# Patient Record
Sex: FEMALE | Race: WHITE
[De-identification: names, ages, dates, MRNs, and addresses within clinical notes are randomized per-mention and may not be internally consistent; named-entity substitution may affect disease eponyms.]

---

## 2017-01-11 ENCOUNTER — HOSPITAL ENCOUNTER (OUTPATIENT)
Dept: HOSPITAL 62 - OD | Age: 32
End: 2017-01-11
Attending: PSYCHIATRY & NEUROLOGY
Payer: MEDICARE

## 2017-01-11 DIAGNOSIS — F31.4: Primary | ICD-10-CM

## 2017-01-11 LAB
ALBUMIN SERPL-MCNC: 3.9 G/DL (ref 3.5–5)
ALP SERPL-CCNC: 82 U/L (ref 38–126)
ALT SERPL-CCNC: 28 U/L (ref 9–52)
ANION GAP SERPL CALC-SCNC: 10 MMOL/L (ref 5–19)
AST SERPL-CCNC: 20 U/L (ref 14–36)
BASOPHILS # BLD AUTO: 0 10^3/UL (ref 0–0.2)
BASOPHILS NFR BLD AUTO: 0.5 % (ref 0–2)
BILIRUB DIRECT SERPL-MCNC: 0 MG/DL (ref 0–0.3)
BILIRUB SERPL-MCNC: 0.4 MG/DL (ref 0.2–1.3)
BUN SERPL-MCNC: 7 MG/DL (ref 7–20)
CALCIUM: 9 MG/DL (ref 8.4–10.2)
CHLORIDE SERPL-SCNC: 108 MMOL/L (ref 98–107)
CO2 SERPL-SCNC: 24 MMOL/L (ref 22–30)
CREAT SERPL-MCNC: 0.64 MG/DL (ref 0.52–1.25)
EOSINOPHIL # BLD AUTO: 0.4 10^3/UL (ref 0–0.6)
EOSINOPHIL NFR BLD AUTO: 3.9 % (ref 0–6)
ERYTHROCYTE [DISTWIDTH] IN BLOOD BY AUTOMATED COUNT: 14.4 % (ref 11.5–14)
GLUCOSE SERPL-MCNC: 102 MG/DL (ref 75–110)
HCT VFR BLD CALC: 35.7 % (ref 36–47)
HGB BLD-MCNC: 12 G/DL (ref 12–15.5)
HGB HCT DIFFERENCE: 0.3
LITHIUM SERPL-SCNC: 0.6 MEQ/L (ref 0.6–1.2)
LYMPHOCYTES # BLD AUTO: 2.8 10^3/UL (ref 0.5–4.7)
LYMPHOCYTES NFR BLD AUTO: 30.2 % (ref 13–45)
MCH RBC QN AUTO: 28.3 PG (ref 27–33.4)
MCHC RBC AUTO-ENTMCNC: 33.6 G/DL (ref 32–36)
MCV RBC AUTO: 84 FL (ref 80–97)
MONOCYTES # BLD AUTO: 0.5 10^3/UL (ref 0.1–1.4)
MONOCYTES NFR BLD AUTO: 5.3 % (ref 3–13)
NEUTROPHILS # BLD AUTO: 5.6 10^3/UL (ref 1.7–8.2)
NEUTS SEG NFR BLD AUTO: 60.1 % (ref 42–78)
POTASSIUM SERPL-SCNC: 4.5 MMOL/L (ref 3.6–5)
PROT SERPL-MCNC: 6.2 G/DL (ref 6.3–8.2)
RBC # BLD AUTO: 4.24 10^6/UL (ref 3.72–5.28)
SODIUM SERPL-SCNC: 142.2 MMOL/L (ref 137–145)
WBC # BLD AUTO: 9.3 10^3/UL (ref 4–10.5)

## 2017-01-11 PROCEDURE — 80178 ASSAY OF LITHIUM: CPT

## 2017-01-11 PROCEDURE — 36415 COLL VENOUS BLD VENIPUNCTURE: CPT

## 2017-01-11 PROCEDURE — 85025 COMPLETE CBC W/AUTO DIFF WBC: CPT

## 2017-01-11 PROCEDURE — 80053 COMPREHEN METABOLIC PANEL: CPT

## 2017-02-16 ENCOUNTER — HOSPITAL ENCOUNTER (OUTPATIENT)
Dept: HOSPITAL 62 - OD | Age: 32
End: 2017-02-16
Attending: PSYCHIATRY & NEUROLOGY
Payer: MEDICARE

## 2017-02-16 DIAGNOSIS — F31.4: Primary | ICD-10-CM

## 2017-02-16 LAB
ALBUMIN SERPL-MCNC: 4.2 G/DL (ref 3.5–5)
ALP SERPL-CCNC: 108 U/L (ref 38–126)
ALT SERPL-CCNC: 40 U/L (ref 9–52)
ANION GAP SERPL CALC-SCNC: 11 MMOL/L (ref 5–19)
AST SERPL-CCNC: 36 U/L (ref 14–36)
BASOPHILS # BLD AUTO: 0.1 10^3/UL (ref 0–0.2)
BASOPHILS NFR BLD AUTO: 0.5 % (ref 0–2)
BILIRUB DIRECT SERPL-MCNC: 0 MG/DL (ref 0–0.3)
BILIRUB SERPL-MCNC: 0.5 MG/DL (ref 0.2–1.3)
BUN SERPL-MCNC: 5 MG/DL (ref 7–20)
CALCIUM: 9.6 MG/DL (ref 8.4–10.2)
CHLORIDE SERPL-SCNC: 106 MMOL/L (ref 98–107)
CO2 SERPL-SCNC: 25 MMOL/L (ref 22–30)
CREAT SERPL-MCNC: 0.75 MG/DL (ref 0.52–1.25)
EOSINOPHIL # BLD AUTO: 0.2 10^3/UL (ref 0–0.6)
EOSINOPHIL NFR BLD AUTO: 2.3 % (ref 0–6)
ERYTHROCYTE [DISTWIDTH] IN BLOOD BY AUTOMATED COUNT: 14.9 % (ref 11.5–14)
GLUCOSE SERPL-MCNC: 114 MG/DL (ref 75–110)
HCT VFR BLD CALC: 38.4 % (ref 36–47)
HGB BLD-MCNC: 12.1 G/DL (ref 12–15.5)
HGB HCT DIFFERENCE: -2.1
LITHIUM SERPL-SCNC: 0.8 MEQ/L (ref 0.6–1.2)
LYMPHOCYTES # BLD AUTO: 2.5 10^3/UL (ref 0.5–4.7)
LYMPHOCYTES NFR BLD AUTO: 23.9 % (ref 13–45)
MCH RBC QN AUTO: 27 PG (ref 27–33.4)
MCHC RBC AUTO-ENTMCNC: 31.5 G/DL (ref 32–36)
MCV RBC AUTO: 86 FL (ref 80–97)
MONOCYTES # BLD AUTO: 0.5 10^3/UL (ref 0.1–1.4)
MONOCYTES NFR BLD AUTO: 4.9 % (ref 3–13)
NEUTROPHILS # BLD AUTO: 7.1 10^3/UL (ref 1.7–8.2)
NEUTS SEG NFR BLD AUTO: 68.4 % (ref 42–78)
POTASSIUM SERPL-SCNC: 4.6 MMOL/L (ref 3.6–5)
PROT SERPL-MCNC: 6.9 G/DL (ref 6.3–8.2)
RBC # BLD AUTO: 4.47 10^6/UL (ref 3.72–5.28)
SODIUM SERPL-SCNC: 141.8 MMOL/L (ref 137–145)
TSH SERPL-ACNC: 0.99 UIU/ML (ref 0.47–4.68)
WBC # BLD AUTO: 10.3 10^3/UL (ref 4–10.5)

## 2017-02-16 PROCEDURE — 80053 COMPREHEN METABOLIC PANEL: CPT

## 2017-02-16 PROCEDURE — 84443 ASSAY THYROID STIM HORMONE: CPT

## 2017-02-16 PROCEDURE — 80178 ASSAY OF LITHIUM: CPT

## 2017-02-16 PROCEDURE — 85025 COMPLETE CBC W/AUTO DIFF WBC: CPT

## 2017-02-16 PROCEDURE — 84439 ASSAY OF FREE THYROXINE: CPT

## 2017-02-16 PROCEDURE — 36415 COLL VENOUS BLD VENIPUNCTURE: CPT

## 2017-04-18 ENCOUNTER — HOSPITAL ENCOUNTER (OUTPATIENT)
Dept: HOSPITAL 62 - OD | Age: 32
End: 2017-04-18
Attending: PSYCHIATRY & NEUROLOGY
Payer: MEDICARE

## 2017-04-18 DIAGNOSIS — F43.12: Primary | ICD-10-CM

## 2017-04-18 DIAGNOSIS — Z79.899: ICD-10-CM

## 2017-04-18 LAB
ALBUMIN SERPL-MCNC: 4.5 G/DL (ref 3.5–5)
ALP SERPL-CCNC: 103 U/L (ref 38–126)
ALT SERPL-CCNC: 46 U/L (ref 9–52)
ANION GAP SERPL CALC-SCNC: 11 MMOL/L (ref 5–19)
AST SERPL-CCNC: 40 U/L (ref 14–36)
BASOPHILS # BLD AUTO: 0.1 10^3/UL (ref 0–0.2)
BASOPHILS NFR BLD AUTO: 0.6 % (ref 0–2)
BILIRUB DIRECT SERPL-MCNC: 0.2 MG/DL (ref 0–0.4)
BILIRUB SERPL-MCNC: 0.3 MG/DL (ref 0.2–1.3)
BUN SERPL-MCNC: 8 MG/DL (ref 7–20)
CALCIUM: 9.4 MG/DL (ref 8.4–10.2)
CHLORIDE SERPL-SCNC: 106 MMOL/L (ref 98–107)
CO2 SERPL-SCNC: 24 MMOL/L (ref 22–30)
CREAT SERPL-MCNC: 0.62 MG/DL (ref 0.52–1.25)
EOSINOPHIL # BLD AUTO: 0.3 10^3/UL (ref 0–0.6)
EOSINOPHIL NFR BLD AUTO: 3.9 % (ref 0–6)
ERYTHROCYTE [DISTWIDTH] IN BLOOD BY AUTOMATED COUNT: 15.9 % (ref 11.5–14)
GLUCOSE SERPL-MCNC: 100 MG/DL (ref 75–110)
HCT VFR BLD CALC: 37.6 % (ref 36–47)
HGB BLD-MCNC: 12.3 G/DL (ref 12–15.5)
HGB HCT DIFFERENCE: -0.7
LITHIUM SERPL-SCNC: 1.2 MEQ/L (ref 0.6–1.2)
LYMPHOCYTES # BLD AUTO: 2 10^3/UL (ref 0.5–4.7)
LYMPHOCYTES NFR BLD AUTO: 22.9 % (ref 13–45)
MCH RBC QN AUTO: 27.1 PG (ref 27–33.4)
MCHC RBC AUTO-ENTMCNC: 32.7 G/DL (ref 32–36)
MCV RBC AUTO: 83 FL (ref 80–97)
MONOCYTES # BLD AUTO: 0.5 10^3/UL (ref 0.1–1.4)
MONOCYTES NFR BLD AUTO: 5.3 % (ref 3–13)
NEUTROPHILS # BLD AUTO: 5.9 10^3/UL (ref 1.7–8.2)
NEUTS SEG NFR BLD AUTO: 67.3 % (ref 42–78)
POTASSIUM SERPL-SCNC: 5.1 MMOL/L (ref 3.6–5)
PROT SERPL-MCNC: 7.2 G/DL (ref 6.3–8.2)
RBC # BLD AUTO: 4.53 10^6/UL (ref 3.72–5.28)
SODIUM SERPL-SCNC: 141.1 MMOL/L (ref 137–145)
WBC # BLD AUTO: 8.8 10^3/UL (ref 4–10.5)

## 2017-04-18 PROCEDURE — 80178 ASSAY OF LITHIUM: CPT

## 2017-04-18 PROCEDURE — 85025 COMPLETE CBC W/AUTO DIFF WBC: CPT

## 2017-04-18 PROCEDURE — 84443 ASSAY THYROID STIM HORMONE: CPT

## 2017-04-18 PROCEDURE — 80053 COMPREHEN METABOLIC PANEL: CPT

## 2017-04-18 PROCEDURE — 36415 COLL VENOUS BLD VENIPUNCTURE: CPT

## 2017-04-18 PROCEDURE — 80156 ASSAY CARBAMAZEPINE TOTAL: CPT

## 2018-01-27 ENCOUNTER — HOSPITAL ENCOUNTER (EMERGENCY)
Dept: HOSPITAL 62 - ER | Age: 33
Discharge: HOME | End: 2018-01-27
Payer: MEDICARE

## 2018-01-27 DIAGNOSIS — Z98.84: ICD-10-CM

## 2018-01-27 DIAGNOSIS — F17.200: ICD-10-CM

## 2018-01-27 DIAGNOSIS — I10: ICD-10-CM

## 2018-01-27 DIAGNOSIS — Z90.89: ICD-10-CM

## 2018-01-27 DIAGNOSIS — R05: ICD-10-CM

## 2018-01-27 DIAGNOSIS — J06.9: Primary | ICD-10-CM

## 2018-01-27 DIAGNOSIS — Z87.01: ICD-10-CM

## 2018-01-27 DIAGNOSIS — R09.89: ICD-10-CM

## 2018-01-27 PROCEDURE — 99283 EMERGENCY DEPT VISIT LOW MDM: CPT

## 2018-01-27 NOTE — ER DOCUMENT REPORT
HPI





- HPI


Pain Level: 4


Notes: 





Patient is a 32-year-old female who presents to the ED complaining of nasal 

congestion/discharge, dry nonproductive cough, irritated throat, subjective 

fever, body aches 3 days.  Patient states that she has had symptoms for 3 days 

not 5 that was reported at [facility].  Patient states that she is still eating and 

drinking without any difficulties.  She is urinating normally and having normal 

bowel movements.  Patient has been using some over-the-counter cold medications 

with minimal relief.  Patient does admit to smoking but denies IV drug use.  

Patient reports having a T&A in the past.  Denies any headache, current fever, 

neck pain, chest pain, palpitations, syncope, shortness of breath, wheeze, 

dyspnea, abdominal pain, nausea/vomiting/diarrhea, urinary retention, dysuria, 

hematuria, or rash.





- ROS


Systems Reviewed and Negative: Yes All other systems reviewed and negative





- REPRODUCTIVE


LMP: 1/10/18


Reproductive: DENIES: Pregnant:





Past Medical History





- Social History


Smoking Status: Current Every Day Smoker


Family History: Reviewed & Not Pertinent





- Past Medical History


Cardiac Medical History: Reports: Hx Hypertension


   Denies: Hx Coronary Artery Disease, Hx Heart Attack


Pulmonary Medical History: Reports: Hx Pneumonia


   Denies: Hx Asthma, Hx Bronchitis, Hx COPD


Neurological Medical History: Denies: Hx Cerebrovascular Accident, Hx Seizures


Musculoskeltal Medical History: Reports Hx Arthritis


Psychiatric Medical History: Reports: Hx Anxiety, Hx Bipolar Disorder, Hx Post 

Traumatic Stress Disorder


Past Surgical History: Reports: Hx Abdominal Surgery - gastric bypass, Hx 

Gastric Bypass Surgery, Hx Tonsillectomy





- Immunizations


Hx Diphtheria, Pertussis, Tetanus Vaccination: Yes





Vertical Provider Document





- CONSTITUTIONAL


Agree With Documented VS: Yes


Notes: 





PHYSICAL EXAMINATION:





GENERAL: Well-appearing, well-nourished and in no acute distress.  A&Ox4





HEAD: Atraumatic, normocephalic.





EYES: Pupils equal round and reactive to light, extraocular movements intact, 

sclera anicteric, conjunctiva are normal.





ENT: EAC clear b/l.  TM's intact b/l without erythema, fluid, or perforation.  

Nares patent and with clear discharge.  oropharynx mild erythema without 

exudates.  Tonsils absent.  No signs of abscess, no oropharyngeal erythema/

exudate.  No palatine shift.  Uvula midline.  No tongue protrusion.  No drooling

, hoarseness, or airway compromise.  Moist mucous membranes.  No sinus 

tenderness.





NECK: Normal range of motion, supple without lymphadenopathy.  No rigidity/

meningismus.





LUNGS: Breath sounds clear to auscultation bilaterally and equal.  No wheezes 

rales or rhonchi.





HEART: Regular rate and rhythm without murmurs, rubs, gallops.





ABDOMEN: Soft, nontender, nondistended abdomen.  No guarding, no rebound.  No 

masses appreciated.  Normal bowel sounds present.  No CVA tenderness 

bilaterally.  No hepatosplenomegaly.





NEUROLOGICAL: Normal speech, normal gait.  Normal sensory, motor exams 





PSYCH: Normal mood, normal affect.





SKIN: Warm, Dry, normal turgor, no rashes or lesions noted.





- INFECTION CONTROL


TRAVEL OUTSIDE OF THE [place] IN LAST 30 DAYS: No





Course





- Re-evaluation


Re-evalutation: 





01/27/18 12:43


Patient is an afebrile, well-hydrated, 32-year-old female who presents to the 

ED with acute URI, suspect viral and probable influenza.  Vitals are stable.  

PE is otherwise unremarkable.  No labs or imaging warranted at this time based 

on H&P.  Patient has no significant comorbidities and appears very clinically 

stable at this time with clear lung sounds.  Low suspicion for any meningitis, 

sepsis, peritonsillar/pharyngeal abscess, respiratory compromise, Ravi's, or 

other emergent systemic condition at this time.  Patient is aware this 

condition can change from initial presentation and she needs to monitor 

symptoms closely.  Conservative measures otherwise for symptoms.  Recheck with 

your PCM in 3-5 days.  Return to the ED with any worsening/concerning symptoms 

otherwise as reviewed in discharge.  Patient is in agreement.





Discharge





- Discharge


Clinical Impression: 


 Acute URI





Condition: Stable


Disposition: HOME, SELF-CARE


Instructions:  Upper Respiratory Illness ([facility])


Additional Instructions: 


Maintain adequate fluid intake


Take meds as directed


tylenol/ibuprofen as needed


over the counter cold medication as needed for symptoms


Humidified air may help


F/u:  with your PCM in 3-5 days for a recheck





Return to the ED with any fever, worsening pain, chest pain, palpitations, 

syncope, worsening HA, neck pain/stiffness, shortness of breath, wheezing, 

drooling, trouble swallowing/breathing, abdominal pain, n/v/d, rash, or 

worsening/concerning symptoms otherwise.


Forms:  Smoking Cessation Education


Referrals: 


[facility] [Provider Group] - Follow up as needed


[facility] [Provider Group] - Follow up as needed

## 2018-02-28 ENCOUNTER — HOSPITAL ENCOUNTER (OUTPATIENT)
Dept: HOSPITAL 62 - OD | Age: 33
End: 2018-02-28
Attending: PSYCHIATRY & NEUROLOGY
Payer: MEDICARE

## 2018-02-28 DIAGNOSIS — F43.12: Primary | ICD-10-CM

## 2018-02-28 LAB
ADD MANUAL DIFF: NO
ALBUMIN SERPL-MCNC: 4.7 G/DL (ref 3.5–5)
ALP SERPL-CCNC: 82 U/L (ref 38–126)
ALT SERPL-CCNC: 41 U/L (ref 9–52)
ANION GAP SERPL CALC-SCNC: 16 MMOL/L (ref 5–19)
AST SERPL-CCNC: 31 U/L (ref 14–36)
BASOPHILS # BLD AUTO: 0.1 10^3/UL (ref 0–0.2)
BASOPHILS NFR BLD AUTO: 0.4 % (ref 0–2)
BILIRUB DIRECT SERPL-MCNC: 0.3 MG/DL (ref 0–0.4)
BILIRUB SERPL-MCNC: 0.3 MG/DL (ref 0.2–1.3)
BUN SERPL-MCNC: 11 MG/DL (ref 7–20)
CALCIUM: 10.3 MG/DL (ref 8.4–10.2)
CHLORIDE SERPL-SCNC: 111 MMOL/L (ref 98–107)
CHOLEST SERPL-MCNC: 208.96 MG/DL (ref 0–200)
CO2 SERPL-SCNC: 17 MMOL/L (ref 22–30)
EOSINOPHIL # BLD AUTO: 0.3 10^3/UL (ref 0–0.6)
EOSINOPHIL NFR BLD AUTO: 1.8 % (ref 0–6)
ERYTHROCYTE [DISTWIDTH] IN BLOOD BY AUTOMATED COUNT: 16.1 % (ref 11.5–14)
GLUCOSE SERPL-MCNC: 103 MG/DL (ref 75–110)
HCT VFR BLD CALC: 40.1 % (ref 36–47)
HGB BLD-MCNC: 13 G/DL (ref 12–15.5)
LDLC SERPL DIRECT ASSAY-MCNC: 137 MG/DL (ref ?–100)
LITHIUM SERPL-SCNC: 1.1 MEQ/L (ref 0.6–1.2)
LYMPHOCYTES # BLD AUTO: 2.6 10^3/UL (ref 0.5–4.7)
LYMPHOCYTES NFR BLD AUTO: 18.4 % (ref 13–45)
MCH RBC QN AUTO: 26.1 PG (ref 27–33.4)
MCHC RBC AUTO-ENTMCNC: 32.5 G/DL (ref 32–36)
MCV RBC AUTO: 80 FL (ref 80–97)
MONOCYTES # BLD AUTO: 0.6 10^3/UL (ref 0.1–1.4)
MONOCYTES NFR BLD AUTO: 4.2 % (ref 3–13)
NEUTROPHILS # BLD AUTO: 10.4 10^3/UL (ref 1.7–8.2)
NEUTS SEG NFR BLD AUTO: 75.2 % (ref 42–78)
PLATELET # BLD: 453 10^3/UL (ref 150–450)
POTASSIUM SERPL-SCNC: 4.6 MMOL/L (ref 3.6–5)
PROT SERPL-MCNC: 7.7 G/DL (ref 6.3–8.2)
RBC # BLD AUTO: 4.99 10^6/UL (ref 3.72–5.28)
SODIUM SERPL-SCNC: 143.5 MMOL/L (ref 137–145)
TOTAL CELLS COUNTED % (AUTO): 100 %
TRIGL SERPL-MCNC: 187 MG/DL (ref ?–150)
VLDLC SERPL CALC-MCNC: 37.4 MG/DL (ref 10–31)
WBC # BLD AUTO: 13.9 10^3/UL (ref 4–10.5)

## 2018-02-28 PROCEDURE — 83036 HEMOGLOBIN GLYCOSYLATED A1C: CPT

## 2018-02-28 PROCEDURE — 85025 COMPLETE CBC W/AUTO DIFF WBC: CPT

## 2018-02-28 PROCEDURE — 36415 COLL VENOUS BLD VENIPUNCTURE: CPT

## 2018-02-28 PROCEDURE — 80178 ASSAY OF LITHIUM: CPT

## 2018-02-28 PROCEDURE — 80061 LIPID PANEL: CPT

## 2018-02-28 PROCEDURE — 80053 COMPREHEN METABOLIC PANEL: CPT

## 2018-02-28 PROCEDURE — 84443 ASSAY THYROID STIM HORMONE: CPT

## 2019-01-23 ENCOUNTER — HOSPITAL ENCOUNTER (OUTPATIENT)
Dept: HOSPITAL 62 - OD | Age: 34
End: 2019-01-23
Attending: PSYCHIATRY & NEUROLOGY
Payer: MEDICARE

## 2019-01-23 DIAGNOSIS — F43.12: Primary | ICD-10-CM

## 2019-01-23 DIAGNOSIS — Z79.899: ICD-10-CM

## 2019-01-23 LAB
ALBUMIN SERPL-MCNC: 4.5 G/DL (ref 3.5–5)
ALP SERPL-CCNC: 56 U/L (ref 38–126)
ALT SERPL-CCNC: 62 U/L (ref 9–52)
ANION GAP SERPL CALC-SCNC: 7 MMOL/L (ref 5–19)
AST SERPL-CCNC: 57 U/L (ref 14–36)
BILIRUB DIRECT SERPL-MCNC: 0.1 MG/DL (ref 0–0.4)
BILIRUB SERPL-MCNC: 0.3 MG/DL (ref 0.2–1.3)
BUN SERPL-MCNC: 11 MG/DL (ref 7–20)
CALCIUM: 9.8 MG/DL (ref 8.4–10.2)
CHLORIDE SERPL-SCNC: 111 MMOL/L (ref 98–107)
CHOLEST SERPL-MCNC: 160.37 MG/DL (ref 0–200)
CO2 SERPL-SCNC: 24 MMOL/L (ref 22–30)
FREE T4 (FREE THYROXINE): 1.22 NG/DL (ref 0.78–2.19)
GLUCOSE SERPL-MCNC: 89 MG/DL (ref 75–110)
LDLC SERPL DIRECT ASSAY-MCNC: 96 MG/DL (ref ?–100)
LITHIUM SERPL-SCNC: 0.9 MEQ/L (ref 0.6–1.2)
POTASSIUM SERPL-SCNC: 4.4 MMOL/L (ref 3.6–5)
PROT SERPL-MCNC: 6.7 G/DL (ref 6.3–8.2)
SODIUM SERPL-SCNC: 142.1 MMOL/L (ref 137–145)
TRIGL SERPL-MCNC: 101 MG/DL (ref ?–150)
TSH SERPL-ACNC: 3.51 UIU/ML (ref 0.47–4.68)
VLDLC SERPL CALC-MCNC: 20 MG/DL (ref 10–31)

## 2019-01-23 PROCEDURE — 84439 ASSAY OF FREE THYROXINE: CPT

## 2019-01-23 PROCEDURE — 80178 ASSAY OF LITHIUM: CPT

## 2019-01-23 PROCEDURE — 36415 COLL VENOUS BLD VENIPUNCTURE: CPT

## 2019-01-23 PROCEDURE — 80061 LIPID PANEL: CPT

## 2019-01-23 PROCEDURE — 80053 COMPREHEN METABOLIC PANEL: CPT

## 2019-01-23 PROCEDURE — 83036 HEMOGLOBIN GLYCOSYLATED A1C: CPT

## 2019-01-23 PROCEDURE — 84443 ASSAY THYROID STIM HORMONE: CPT

## 2019-03-15 ENCOUNTER — HOSPITAL ENCOUNTER (EMERGENCY)
Dept: HOSPITAL 62 - ER | Age: 34
Discharge: HOME | End: 2019-03-15
Payer: MEDICARE

## 2019-03-15 VITALS — SYSTOLIC BLOOD PRESSURE: 130 MMHG | DIASTOLIC BLOOD PRESSURE: 68 MMHG

## 2019-03-15 DIAGNOSIS — I10: ICD-10-CM

## 2019-03-15 DIAGNOSIS — L03.211: Primary | ICD-10-CM

## 2019-03-15 DIAGNOSIS — R22.0: ICD-10-CM

## 2019-03-15 PROCEDURE — 99283 EMERGENCY DEPT VISIT LOW MDM: CPT

## 2019-03-15 NOTE — ER DOCUMENT REPORT
ED Medical Screen (RME)





- General


Stated Complaint: FACIAL SWELLING


Time Seen by Provider: 03/15/19 21:10


Primary Care Provider: 


ROSA RIOS MD [NO LOCAL MD] - Follow up as needed


Notes: 





PT C/O FACIAL SWELLING POSSIBLE ABSCESS FOR 5 DAYS, NO TROUBLE SWALLOWING 








I have greeted and performed a rapid initial assessment of this patient.  A 

comprehensive ED assessment and evaluation of the patient, analysis of test 

results and completion of the medical decision making process will be conducted 

by additional ED providers.


TRAVEL OUTSIDE OF THE [place] IN LAST 30 DAYS: No





- Related Data


Allergies/Adverse Reactions: 


                                        





Penicillins Allergy (Verified 01/27/18 12:05)


   











Past Medical History





- Past Medical History


Cardiac Medical History: Reports: Hx Hypertension


   Denies: Hx Coronary Artery Disease, Hx Heart Attack


Pulmonary Medical History: Reports: Hx Pneumonia


   Denies: Hx Asthma, Hx Bronchitis, Hx COPD


Neurological Medical History: Denies: Hx Cerebrovascular Accident, Hx Seizures


Renal/ Medical History: Denies: Hx Peritoneal Dialysis


Musculoskeltal Medical History: Reports Hx Arthritis


Psychiatric Medical History: Reports: Hx Anxiety, Hx Bipolar Disorder, Hx Post 

Traumatic Stress Disorder


Past Surgical History: Reports: Hx Abdominal Surgery - gastric bypass, Hx 

Gastric Bypass Surgery, Hx Tonsillectomy





- Immunizations


Hx Diphtheria, Pertussis, Tetanus Vaccination: Yes





Physical Exam





- Vital signs


Vitals: 


                                        











Temp Pulse Resp BP Pulse Ox


 


 98.1 F   79   18   130/68 H  99 


 


 03/15/19 21:18  03/15/19 21:18  03/15/19 21:18  03/15/19 21:18  03/15/19 21:18














Course





- Vital Signs


Vital signs: 


                                        











Temp Pulse Resp BP Pulse Ox


 


 98.1 F   79   18   130/68 H  99 


 


 03/15/19 21:18  03/15/19 21:18  03/15/19 21:18  03/15/19 21:18  03/15/19 21:18














Doctor's Discharge





- Discharge


Clinical Impression: 


 Cellulitis of face





Condition: Stable


Disposition: HOME, SELF-CARE


Instructions:  Cellulitis ([facility]), Cephalexin ([facility])


Additional Instructions: 


Keep the skin clean


Wash with soap and water


Tylenol/ibuprofen if needed


Triple antibiotic ointment daily


Take medication as directed


Monitor for any worsening symptoms


Recheck with your PCM in 2-3 days


Return to the ED with any worsening symptoms and/or development of fever, 

headache, chest pain, palpitations, syncope, shortness of breath, trouble 

breathing, abdominal pain, n/v/d, abscess, purulent discharge, red streaks, 

worsening swelling, or other worsening symptoms that are concerning to you.


Prescriptions: 


Cephalexin Monohydrate [Keflex 500 mg Capsule] 500 mg PO TID #30 capsule


Forms:  Elevated Blood Pressure


Referrals: 


ROSA RIOS MD [NO LOCAL MD] - Follow up as needed

## 2019-03-15 NOTE — ER DOCUMENT REPORT
HPI





- HPI


Time Seen by Provider: 03/15/19 21:10


Notes: 





Patient is a 33-year-old female no significant past medical history who presents

to the emergency department complaining of redness and swelling to her left 

lower face times 5 days.  Patient states that she does have some soreness 

associated with it.  She has not noticed any abscess or purulent discharge.  

Patient states that everything is on the outside of her mouth and has no 

associated dental pain or trouble swallowing.  She is eating and drinking 

without difficulty.  She is urinating normally.  Patient states that it started 

when she tried to pop as it on her chin.  Patient noticed that the next couple 

days the area started to become red and sore.  Patient states that she has an 

allergy to penicillins, but only causes a yeast infection.  No other concerns or

complaints.  Denies IV drug abuse.  Denies any headache, fever, head injury, 

neck pain, URI, sore throat, chest pain, palpitations, syncope, cough, shortness

of breath, wheeze, dyspnea, abdominal pain, nausea/vomiting/diarrhea, urinary 

retention, dysuria, hematuria, or rash.





- ROS


Systems Reviewed and Negative: Yes All other systems reviewed and negative





- REPRODUCTIVE


Reproductive: DENIES: Pregnant:





Past Medical History





- Social History


Smoking Status: Never Smoker


Family History: Reviewed & Not Pertinent





- Past Medical History


Cardiac Medical History: Reports: Hx Hypertension


   Denies: Hx Coronary Artery Disease, Hx Heart Attack


Pulmonary Medical History: Reports: Hx Pneumonia


   Denies: Hx Asthma, Hx Bronchitis, Hx COPD


Neurological Medical History: Denies: Hx Cerebrovascular Accident, Hx Seizures


Renal/ Medical History: Denies: Hx Peritoneal Dialysis


Musculoskeletal Medical History: Reports Hx Arthritis


Psychiatric Medical History: Reports: Hx Anxiety, Hx Bipolar Disorder, Hx Post 

Traumatic Stress Disorder


Past Surgical History: Reports: Hx Abdominal Surgery - gastric bypass, Hx 

Gastric Bypass Surgery, Hx Tonsillectomy





- Immunizations


Hx Diphtheria, Pertussis, Tetanus Vaccination: Yes





Vertical Provider Document





- CONSTITUTIONAL


Agree With Documented VS: Yes


Notes: 





PHYSICAL EXAMINATION:





GENERAL: Well-appearing, well-nourished and in no acute distress.





HEAD: Atraumatic, normocephalic.





EYES: Pupils equal round and reactive to light, extraocular movements intact, 

sclera anicteric, conjunctiva are normal.





ENT: EAC clear b/l.  TM's intact b/l without erythema, fluid, or perforation.  

Nares patent and without discharge.  oropharynx clear without exudates.  No 

tonsilar hypertrophy or erythema.  Moist mucous membranes.  No sinus tenderness.

 Uvula midline.  No palatine shift.  No tongue protrusion.  No respiratory 

compromise.





Mouth:  Poor dentition.  + mild decay and mild gingivitis.  No obvious abscess 

or discharge noted. No dental tenderness or oral abscess.





NECK: Normal range of motion, supple without lymphadenopathy.  No 

rigidity/meningismus.





LUNGS: Breath sounds clear to auscultation bilaterally and equal.  No wheezes 

rales or rhonchi.





HEART: Regular rate and rhythm without murmurs, rubs, gallops.





NEUROLOGICAL: Cranial nerves grossly intact.  Normal speech, normal gait.  

Normal sensory, motor exams 





PSYCH: Normal mood, normal affect.





SKIN: face:  there is a mild area of erythema to the lower cheek/jaw without any

fluctuance, streaks, or purulence noted.  there is very mild tenderness 

associated.  I placed an US over the area and no fluid pockets were noted.





- INFECTION CONTROL


TRAVEL OUTSIDE OF THE [place] IN LAST 30 DAYS: No





Course





- Re-evaluation


Re-evalutation: 





03/15/19 23:35


Patient is an afebrile, well-hydrated, 33-year-old female who presents the 

emergency department with a very mild cellulitis to the lower face.  Vitals are 

acceptable without significant tachycardia, tachypnea, or hypoxia.  PE is 

otherwise unremarkable.  Bedside ultrasound was unremarkable for abscess.  No 

incision and drainage is warranted at this time.  Patient does not have a true 

allergy to penicillins.  Keflex 1 dose given today.  No labs or imaging 

warranted.  She is nontoxic-appearing and is tolerating p.o. without difficulty.

 Low suspicion for any SJS, necrotizing fasciitis, sepsis, meningitis, or other 

systemic emergent condition at this time.  Patient aware that condition can 

change and she needs to monitor symptoms closely.  I will send her home with a 

prescription for Keflex.  Recheck with your PCM in 2-3 days.  Return to the ED 

with any other worsening/concerning symptoms as reviewed.  Patient is in 

agreement.





Discharge





- Discharge


Clinical Impression: 


 Cellulitis of face





Condition: Stable


Disposition: HOME, SELF-CARE


Instructions:  Cellulitis ([facility]), Cephalexin ([facility])


Additional Instructions: 


Keep the skin clean


Wash with soap and water


Tylenol/ibuprofen if needed


Triple antibiotic ointment daily


Take medication as directed


Monitor for any worsening symptoms


Recheck with your PCM in 2-3 days


Return to the ED with any worsening symptoms and/or development of fever, 

headache, chest pain, palpitations, syncope, shortness of breath, trouble 

breathing, abdominal pain, n/v/d, abscess, purulent discharge, red streaks, 

worsening swelling, or other worsening symptoms that are concerning to you.


Prescriptions: 


Cephalexin Monohydrate [Keflex 500 mg Capsule] 500 mg PO TID #30 capsule


Forms:  Elevated Blood Pressure


Referrals: 


ROSA RIOS MD [Primary Care Provider] - Follow up as needed

## 2019-03-23 ENCOUNTER — HOSPITAL ENCOUNTER (EMERGENCY)
Dept: HOSPITAL 62 - ER | Age: 34
Discharge: HOME | End: 2019-03-23
Payer: MEDICARE

## 2019-03-23 VITALS — DIASTOLIC BLOOD PRESSURE: 70 MMHG | SYSTOLIC BLOOD PRESSURE: 111 MMHG

## 2019-03-23 DIAGNOSIS — F17.210: ICD-10-CM

## 2019-03-23 DIAGNOSIS — Z98.84: ICD-10-CM

## 2019-03-23 DIAGNOSIS — L30.9: Primary | ICD-10-CM

## 2019-03-23 PROCEDURE — 99283 EMERGENCY DEPT VISIT LOW MDM: CPT

## 2019-03-23 NOTE — ER DOCUMENT REPORT
HPI





- HPI


Patient complains to provider of: Skin rash


Time Seen by Provider: 03/23/19 20:40


Onset: Other - 2 weeks


Onset/Duration: Persistent


Quality of pain: Burning


Pain Level: 3


Context: 





Patient complains of burning painful rash to anterior aspect of her neck for the

past 2 weeks.  Patient was seen here on 3/15/2019 for this complaint and 

diagnosed with cellulitis and placed on an antibiotic Keflex.  Patient states 

she has been taking this medication as prescribed and had been using Dial soap 

as well as topical A&E ointment and occasional triple antibiotic ointment.  

Patient denies any improvement of her rash.  Patient denies any other new foods 

or medications.  Patient states she takes numerous psychiatric medications but 

is uncertain of the names of them.


Associated Symptoms: Other - Skin rash.  denies: Fever


Exacerbated by: Denies


Relieved by: Denies


Similar symptoms previously: No


Recently seen / treated by doctor: Yes





- ROS


ROS below otherwise negative: Yes


Systems Reviewed and Negative: Yes All other systems reviewed and negative





- CONSTITUTIONAL


Constitutional: DENIES: Fever, Chills





- EENT


EENT: DENIES: Sore Throat





- NEURO


Neurology: DENIES: Headache, Weakness





- RESPIRATORY


Respiratory: DENIES: Coughing





- GASTROINTESTINAL


Gastrointestinal: DENIES: Nausea, Patient vomiting





- REPRODUCTIVE


LMP: 2/27/19


Reproductive: DENIES: Pregnant:





- DERM


Skin Color: Erythema


Skin Problems: Rash





Past Medical History





- General


Information source: Patient





- Social History


Smoking Status: Current Every Day Smoker


Smoking Education Provided: Yes


Frequency of alcohol use: Occasional


Drug Abuse: None


Occupation: None


Lives with: Spouse/Significant other


Family History: Reviewed & Not Pertinent


Patient has suicidal ideation: No


Patient has homicidal ideation: No


Pulmonary Medical History: Reports: Hx Pneumonia


Neurological Medical History: Denies: Hx Cerebrovascular Accident, Hx Seizures


Renal/ Medical History: Denies: Hx Peritoneal Dialysis


Musculoskeletal Medical History: Reports Hx Arthritis


Psychiatric Medical History: Reports: Hx Anxiety, Hx Bipolar Disorder, Hx Post 

Traumatic Stress Disorder


Past Surgical History: Reports: Hx Abdominal Surgery - gastric bypass, Hx Gastri

c Bypass Surgery, Hx Tonsillectomy





- Immunizations


Hx Diphtheria, Pertussis, Tetanus Vaccination: Yes





Vertical Provider Document





- CONSTITUTIONAL


Agree With Documented VS: Yes


Exam Limitations: No Limitations


General Appearance: WD/WN, No Apparent Distress





- INFECTION CONTROL


TRAVEL OUTSIDE OF THE [place] IN LAST 30 DAYS: No





- HEENT


HEENT: Atraumatic, Normocephalic.  negative: Pharyngeal Exudate, Pharyngeal 

Tenderness, Pharyngeal Erythema, Tympanic Membrane Red, Tympanic Membrane 

Bulging





- NECK


Neck: Supple.  negative: Lymphadenopathy-Left, Lymphadenopathy-Right


Notes: 





Patient with dry erythematous skin with a parched appearance in some places to 

the anterior aspect of patient's chin and neck.  No bulla, no vesicular lesions.

 No induration, no abscess.





- RESPIRATORY


Respiratory: Breath Sounds Normal, No Respiratory Distress





- CARDIOVASCULAR


Cardiovascular: Regular Rate, Regular Rhythm





- GI/ABDOMEN


Gastrointestinal: Abdomen Soft, Abdomen Non-Tender, No Organomegaly, Normal 

Bowel Sounds





- BACK


Back: Normal Inspection





- MUSCULOSKELETAL/EXTREMETIES


Musculoskeletal/Extremeties: MAEW, FROM





- NEURO


Level of Consciousness: Awake, Alert, Appropriate


Motor/Sensory: No Motor Deficit





- DERM


Integumentary: Warm, Dry, Rash - Erythematous macular rash to anterior aspect of

chin and neck area, no calor





Course





- Re-evaluation


Re-evalutation: 





03/23/19 21:10


Consulted with Dr. Sandhu regarding patient presentation, recommends finishing 

antibiotics and starting triamcinolone topically.





03/23/19 21:21


Patient with what appears to be a dermatitis.  No concern for sepsis, York-

Cluadio syndrome, ludwigs angina or abscess.  Patient nontoxic in appearance 

with stable vital signs.  Patient without fever.  Patient encouraged to follow-

up with her primary doctor for further evaluation and a recheck on Monday.  Good

return precautions discussed with patient and her significant other.





- Vital Signs


Vital signs: 


                                        











Temp Pulse Resp BP Pulse Ox


 


 97.4 F   58 L  20   113/64   99 


 


 03/23/19 19:26  03/23/19 19:26  03/23/19 19:26  03/23/19 19:26  03/23/19 19:26














Discharge





- Discharge


Clinical Impression: 


 Dermatitis





Condition: Stable


Disposition: HOME, SELF-CARE


Instructions:  Contact Dermatitis ([facility]), Topical Steroid Cream or Ointment ([facility])


Additional Instructions: 


Return immediately for any new or worsening symptoms





Followup with your primary care provider, call Monday to make a followup 

appointment





Use your normal soaps and detergents to cleanse her skin





Apply a topical steroid cream as prescribed





Finish your oral antibiotics as previously prescribed


Prescriptions: 


Triamcinolone Acetonide [Aristocort 0.1% Cream] 1 applic TP TID #60 gm


Referrals: 


DEE NI DO [NO LOCAL MD] - 03/25/19

## 2020-01-08 ENCOUNTER — HOSPITAL ENCOUNTER (OUTPATIENT)
Dept: HOSPITAL 62 - OD | Age: 35
End: 2020-01-08
Attending: PSYCHIATRY & NEUROLOGY
Payer: MEDICARE

## 2020-01-08 DIAGNOSIS — F43.12: Primary | ICD-10-CM

## 2020-01-08 LAB
ADD MANUAL DIFF: NO
ALBUMIN SERPL-MCNC: 4.4 G/DL (ref 3.5–5)
ALP SERPL-CCNC: 56 U/L (ref 38–126)
ANION GAP SERPL CALC-SCNC: 9 MMOL/L (ref 5–19)
AST SERPL-CCNC: 49 U/L (ref 14–36)
BASOPHILS # BLD AUTO: 0 10^3/UL (ref 0–0.2)
BASOPHILS NFR BLD AUTO: 0.7 % (ref 0–2)
BILIRUB DIRECT SERPL-MCNC: 0.2 MG/DL (ref 0–0.4)
BILIRUB SERPL-MCNC: 0.4 MG/DL (ref 0.2–1.3)
BUN SERPL-MCNC: 16 MG/DL (ref 7–20)
CALCIUM: 9.7 MG/DL (ref 8.4–10.2)
CHLORIDE SERPL-SCNC: 110 MMOL/L (ref 98–107)
CHOLEST SERPL-MCNC: 167.08 MG/DL (ref 0–200)
CO2 SERPL-SCNC: 22 MMOL/L (ref 22–30)
EOSINOPHIL # BLD AUTO: 0.3 10^3/UL (ref 0–0.6)
EOSINOPHIL NFR BLD AUTO: 4.8 % (ref 0–6)
ERYTHROCYTE [DISTWIDTH] IN BLOOD BY AUTOMATED COUNT: 13 % (ref 11.5–14)
FREE T4 (FREE THYROXINE): 0.81 NG/DL (ref 0.78–2.19)
GLUCOSE SERPL-MCNC: 77 MG/DL (ref 75–110)
HCT VFR BLD CALC: 39.5 % (ref 36–47)
HGB BLD-MCNC: 13.5 G/DL (ref 12–15.5)
LDLC SERPL DIRECT ASSAY-MCNC: 114 MG/DL (ref ?–100)
LITHIUM SERPL-SCNC: 0.9 MEQ/L (ref 0.6–1.2)
LYMPHOCYTES # BLD AUTO: 2.1 10^3/UL (ref 0.5–4.7)
LYMPHOCYTES NFR BLD AUTO: 30.7 % (ref 13–45)
MCH RBC QN AUTO: 32.6 PG (ref 27–33.4)
MCHC RBC AUTO-ENTMCNC: 34.2 G/DL (ref 32–36)
MCV RBC AUTO: 95 FL (ref 80–97)
MONOCYTES # BLD AUTO: 0.4 10^3/UL (ref 0.1–1.4)
MONOCYTES NFR BLD AUTO: 5.5 % (ref 3–13)
NEUTROPHILS # BLD AUTO: 4 10^3/UL (ref 1.7–8.2)
NEUTS SEG NFR BLD AUTO: 58.3 % (ref 42–78)
PLATELET # BLD: 247 10^3/UL (ref 150–450)
POTASSIUM SERPL-SCNC: 4 MMOL/L (ref 3.6–5)
PROT SERPL-MCNC: 7.1 G/DL (ref 6.3–8.2)
RBC # BLD AUTO: 4.14 10^6/UL (ref 3.72–5.28)
TOTAL CELLS COUNTED % (AUTO): 100 %
TRIGL SERPL-MCNC: 85 MG/DL (ref ?–150)
TSH SERPL-ACNC: 1.72 UIU/ML (ref 0.47–4.68)
VLDLC SERPL CALC-MCNC: 17 MG/DL (ref 10–31)
WBC # BLD AUTO: 6.9 10^3/UL (ref 4–10.5)

## 2020-01-08 PROCEDURE — 80178 ASSAY OF LITHIUM: CPT

## 2020-01-08 PROCEDURE — 84443 ASSAY THYROID STIM HORMONE: CPT

## 2020-01-08 PROCEDURE — 80053 COMPREHEN METABOLIC PANEL: CPT

## 2020-01-08 PROCEDURE — 36415 COLL VENOUS BLD VENIPUNCTURE: CPT

## 2020-01-08 PROCEDURE — 85025 COMPLETE CBC W/AUTO DIFF WBC: CPT

## 2020-01-08 PROCEDURE — 84439 ASSAY OF FREE THYROXINE: CPT

## 2020-01-08 PROCEDURE — 80061 LIPID PANEL: CPT
